# Patient Record
Sex: MALE | Race: WHITE | Employment: FULL TIME | URBAN - NONMETROPOLITAN AREA
[De-identification: names, ages, dates, MRNs, and addresses within clinical notes are randomized per-mention and may not be internally consistent; named-entity substitution may affect disease eponyms.]

---

## 2018-02-15 ENCOUNTER — DOCUMENTATION ONLY (OUTPATIENT)
Dept: FAMILY MEDICINE | Facility: OTHER | Age: 23
End: 2018-02-15

## 2020-06-21 ENCOUNTER — APPOINTMENT (OUTPATIENT)
Dept: GENERAL RADIOLOGY | Facility: OTHER | Age: 25
End: 2020-06-21
Attending: PHYSICIAN ASSISTANT
Payer: COMMERCIAL

## 2020-06-21 ENCOUNTER — HOSPITAL ENCOUNTER (EMERGENCY)
Facility: OTHER | Age: 25
Discharge: HOME | End: 2020-06-21
Attending: PHYSICIAN ASSISTANT | Admitting: PHYSICIAN ASSISTANT
Payer: COMMERCIAL

## 2020-06-21 VITALS
SYSTOLIC BLOOD PRESSURE: 136 MMHG | BODY MASS INDEX: 23.22 KG/M2 | TEMPERATURE: 97.5 F | RESPIRATION RATE: 18 BRPM | DIASTOLIC BLOOD PRESSURE: 87 MMHG | HEART RATE: 87 BPM | WEIGHT: 170 LBS | OXYGEN SATURATION: 98 %

## 2020-06-21 DIAGNOSIS — T07.XXXA ABRASIONS OF MULTIPLE SITES: ICD-10-CM
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
## 2020-06-21 DIAGNOSIS — M25.561 ACUTE PAIN OF RIGHT KNEE: ICD-10-CM

## 2020-06-21 DIAGNOSIS — M23.91 INTERNAL DERANGEMENT OF KNEE, RIGHT: ICD-10-CM

## 2020-06-21 PROCEDURE — 25000132 ZZH RX MED GY IP 250 OP 250 PS 637: Performed by: PHYSICIAN ASSISTANT

## 2020-06-21 PROCEDURE — 73562 X-RAY EXAM OF KNEE 3: CPT | Mod: RT

## 2020-06-21 PROCEDURE — 99283 EMERGENCY DEPT VISIT LOW MDM: CPT | Mod: Z6 | Performed by: PHYSICIAN ASSISTANT

## 2020-06-21 PROCEDURE — 99283 EMERGENCY DEPT VISIT LOW MDM: CPT | Performed by: PHYSICIAN ASSISTANT

## 2020-06-21 RX ORDER — SULFAMETHOXAZOLE/TRIMETHOPRIM 800-160 MG
1 TABLET ORAL ONCE
Status: COMPLETED | OUTPATIENT
Start: 2020-06-21 | End: 2020-06-21

## 2020-06-21 RX ORDER — GINSENG 100 MG
500 CAPSULE ORAL ONCE
Status: DISCONTINUED | OUTPATIENT
Start: 2020-06-21 | End: 2020-06-21 | Stop reason: HOSPADM

## 2020-06-21 RX ORDER — IBUPROFEN 800 MG/1
800 TABLET, FILM COATED ORAL EVERY 8 HOURS PRN
Qty: 60 TABLET | Refills: 0 | Status: SHIPPED | OUTPATIENT
Start: 2020-06-21

## 2020-06-21 RX ORDER — SULFAMETHOXAZOLE/TRIMETHOPRIM 800-160 MG
1 TABLET ORAL 2 TIMES DAILY
Qty: 20 TABLET | Refills: 0 | Status: SHIPPED | OUTPATIENT
Start: 2020-06-21 | End: 2020-07-01

## 2020-06-21 RX ORDER — HYDROCODONE BITARTRATE AND ACETAMINOPHEN 5; 325 MG/1; MG/1
1 TABLET ORAL ONCE
Status: COMPLETED | OUTPATIENT
Start: 2020-06-21 | End: 2020-06-21

## 2020-06-21 RX ADMIN — SULFAMETHOXAZOLE AND TRIMETHOPRIM 1 TABLET: 800; 160 TABLET ORAL at 17:50

## 2020-06-21 RX ADMIN — HYDROCODONE BITARTRATE AND ACETAMINOPHEN 1 TABLET: 5; 325 TABLET ORAL at 17:24

## 2020-06-21 ASSESSMENT — ENCOUNTER SYMPTOMS
CHEST TIGHTNESS: 0
WOUND: 1
SINUS PRESSURE: 0
LIGHT-HEADEDNESS: 0
VOMITING: 0
ABDOMINAL PAIN: 0
COUGH: 0
VOICE CHANGE: 0
CONFUSION: 0
EYE PAIN: 0
DYSURIA: 0
SEIZURES: 0
FEVER: 0
TROUBLE SWALLOWING: 0
DIZZINESS: 0
NAUSEA: 0
SORE THROAT: 0
FATIGUE: 0
ADENOPATHY: 0
WEAKNESS: 0
SHORTNESS OF BREATH: 0
HEADACHES: 0
DIARRHEA: 0
BRUISES/BLEEDS EASILY: 0
APPETITE CHANGE: 0
HEMATURIA: 0
ACTIVITY CHANGE: 0
BACK PAIN: 0
FACIAL SWELLING: 0
FREQUENCY: 0
CHILLS: 0
NECK PAIN: 0

## 2020-06-21 NOTE — LETTER
Negative  June 21, 2020      To Whom It May Concern:      Harry Childress was seen in our Emergency Department today, 06/21/20.  He will need the next few days off work to recover from his injuries but can return to work on 6/25/2020.  He may return sooner if his symptoms improve considerably.      Sincerely,              Benedict Luu PA-C

## 2020-06-21 NOTE — ED TRIAGE NOTES
Pt was long boarding yesterday when his board hit a twig and he lost control and fell off his board while going down a hill. Pt hit on his right side and landed on the cement and rolled and then states he was quickly able to get back up on his feet. Pt did not hit his head. Pt states both his knees has abrasion and so does his right fingers. Pt states he iced his knee yesterday but today he could barely put any weight on it. Pt rates right knee pain at 9/10 when standing. Pt also feels pain in the back of his right calf.  Renae Drew RN .............. 6/21/2020  3:40 PM

## 2020-06-21 NOTE — ED PROVIDER NOTES
History     Chief Complaint   Patient presents with     Knee Pain     HPI  Harry Childress is a 24 year old male who was long boarding yesterday when he hit a twig and lost control and fell off his long board.  He sustained abrasions to both his knees as well as his right lower back area.  He was able to ambulate yesterday however he has worsening pain today.  Rates pain as a 9/10 when standing.  Denies any other injuries denies any head injury.  He denies having helmet or elbow or knee pads on at the time.    Allergies:  No Known Allergies    Problem List:    Patient Active Problem List    Diagnosis Date Noted     Sinusitis, acute 08/15/2012     Priority: Medium     Acute pharyngitis 2012     Priority: Medium        Past Medical History:    No past medical history on file.    Past Surgical History:    No past surgical history on file.    Family History:    No family history on file.    Social History:  Marital Status:  Single [1]  Social History     Tobacco Use     Smoking status: Former Smoker     Packs/day: 0.10     Types: Cigarettes     Last attempt to quit: 2015     Years since quittin.9     Smokeless tobacco: Current User     Types: Chew   Substance Use Topics     Alcohol use: No     Alcohol/week: 0.0 standard drinks     Drug use: Unknown     Types: Other     Comment: Drug use: No        Medications:    No current outpatient medications on file.        Review of Systems   Constitutional: Negative for activity change, appetite change, chills, fatigue and fever.   HENT: Negative for congestion, facial swelling, sinus pressure, sore throat, trouble swallowing and voice change.    Eyes: Negative for pain and visual disturbance.   Respiratory: Negative for cough, chest tightness and shortness of breath.    Cardiovascular: Negative for chest pain.   Gastrointestinal: Negative for abdominal pain, diarrhea, nausea and vomiting.   Genitourinary: Negative for dysuria, frequency, hematuria and urgency.    Musculoskeletal: Negative for back pain and neck pain.   Skin: Positive for wound. Negative for rash.        Multiple abrasions to his right lower back area, bilateral knee area, lateral right knee area, and right elbow.   Neurological: Negative for dizziness, seizures, syncope, weakness, light-headedness and headaches.   Hematological: Negative for adenopathy. Does not bruise/bleed easily.   Psychiatric/Behavioral: Negative for confusion.       Physical Exam   BP: 136/87  Pulse: 87  Heart Rate: 98  Temp: 97.5  F (36.4  C)  Resp: 18  Weight: 77.1 kg (170 lb)  SpO2: 98 %      Physical Exam  Constitutional:       General: He is not in acute distress.     Appearance: He is not ill-appearing, toxic-appearing or diaphoretic.   HENT:      Head: Atraumatic.      Right Ear: Tympanic membrane normal. No drainage or tenderness.      Left Ear: Tympanic membrane normal. No drainage or tenderness.      Nose: Nose normal. No rhinorrhea.   Eyes:      General: No scleral icterus.     Extraocular Movements: Extraocular movements intact.      Right eye: Normal extraocular motion and no nystagmus.      Left eye: Normal extraocular motion and no nystagmus.      Pupils: Pupils are equal, round, and reactive to light. Pupils are equal.      Funduscopic exam:     Right eye: No AV nicking or papilledema. Red reflex present.         Left eye: No AV nicking or papilledema. Red reflex present.  Neck:      Musculoskeletal: Normal range of motion. No neck rigidity, pain with movement or muscular tenderness.      Vascular: No JVD.      Trachea: No tracheal deviation.   Cardiovascular:      Rate and Rhythm: Normal rate and regular rhythm.      Heart sounds: Normal heart sounds. No friction rub.   Pulmonary:      Effort: No respiratory distress.      Breath sounds: Normal breath sounds. No stridor.   Abdominal:      General: Bowel sounds are normal.      Palpations: Abdomen is soft.      Tenderness: There is no abdominal tenderness. There is no  guarding or rebound.   Musculoskeletal: Normal range of motion.         General: No tenderness.      Comments: Multiple abrasions to his right lower back area, bilateral knee area, lateral right knee area, and right elbow.  Some increased erythema around his right knee wound.  All of these wounds were cleansed with povidone-iodine.  Otherwise CMS x4.   Lymphadenopathy:      Cervical: No cervical adenopathy.      Right cervical: No superficial cervical adenopathy.     Left cervical: No superficial cervical adenopathy.   Skin:     General: Skin is warm.      Capillary Refill: Capillary refill takes less than 2 seconds.      Findings: No rash.   Neurological:      General: No focal deficit present.      Mental Status: He is alert and oriented to person, place, and time.         ED Course        Results for orders placed or performed during the hospital encounter of 06/21/20 (from the past 24 hour(s))   XR Knee Right 3 Views    Narrative    XR KNEE RT 3 VW    HISTORY: 24 years Male pain    COMPARISON: None    TECHNIQUE: 3 views right knee    FINDINGS: Joint spaces are congruent. There is no significant  arthritic change. There is no evidence of fracture or dislocation.  There is a small joint effusion.      Impression    IMPRESSION: Very small joint effusion otherwise negative study.    NATO LARSEN MD       Medications   bacitracin ointment 1 g (has no administration in time range)   HYDROcodone-acetaminophen (NORCO) 5-325 MG per tablet 1 tablet (1 tablet Oral Given 6/21/20 1724)   sulfamethoxazole-trimethoprim (BACTRIM DS) 800-160 MG per tablet 1 tablet (1 tablet Oral Given 6/21/20 1750)       Assessments & Plan (with Medical Decision Making)     I have reviewed the nursing notes.    I have reviewed the findings, diagnosis, plan and need for follow up with the patient.      New Prescriptions    IBUPROFEN (ADVIL/MOTRIN) 800 MG TABLET    Take 1 tablet (800 mg) by mouth every 8 hours as needed for moderate pain     SULFAMETHOXAZOLE-TRIMETHOPRIM (BACTRIM DS) 800-160 MG TABLET    Take 1 tablet by mouth 2 times daily for 10 days       Final diagnoses:   Fall from skateboard, initial encounter   Internal derangement of knee, right   Acute pain of right knee   Abrasions of multiple sites - Bilateral knees, right forearm, and right posterior back,     Afebrile.  Vital signs stable.  Patient falling off a long board yesterday here today with increasing right knee pain and inability to ambulate due to pain.  Significant multiple abrasions to his bilateral knees, right forearm, and posterior right lower back area.  These were cleansed with povidone-iodine and re-bandaged with bacitracin Adaptic 4 x 4's Kerlix and Ace bandages.  Right knee x-rays show a very small joint effusion otherwise unremarkable.  Right knee internal derangement and acute pain.  He was given an oral Norco in the ER for pain relief.  Also given a Bactrim to help prevent infection given his areas of abrasions.  He will need to follow-up with his primary care provider when he returns for further evaluation of his right knee pain.  Rx in the short-term for Motrin, Norco and Bactrim.  Follow-up sooner if there is any other concerns problems or questions.  He does note initially that he has crutches at home and declines need from here.  However then later informed the RN that he probably needs a new pair of crutches and he was fitted for some.        6/21/2020   [facility]     Benedict Leon PA-C  06/21/20 1803       Benedict Leon PA-C  06/21/20 1814

## 2020-06-21 NOTE — ED AVS SNAPSHOT
[facility]  [address]  [place] [address]  Phone:  [phone]  Fax:  [phone]                                    Harry Childress   MRN: [id number]    Department:  [facility]   Date of Visit:  6/21/2020           After Visit Summary Signature Page    I have received my discharge instructions, and my questions have been answered. I have discussed any challenges I see with this plan with the nurse or doctor.    ..........................................................................................................................................  Patient/Patient Representative Signature      ..........................................................................................................................................  Patient Representative Print Name and Relationship to Patient    ..................................................               ................................................  Date                                   Time    ..........................................................................................................................................  Reviewed by Signature/Title    ...................................................              ..............................................  Date                                               Time          22EPIC Rev 08/18

## 2020-06-21 NOTE — ED NOTES
Crutches given to patient. Abrasions to both knees bacitracin applied. Right knee wrapped with ace wrap. Renae rDew RN .............. 6/21/2020  6:16 PM

## (undated) RX ORDER — NEOMYCIN/BACITRACIN/POLYMYXINB 3.5-400-5K
OINTMENT (GRAM) TOPICAL
Status: DISPENSED
Start: 2020-06-21

## (undated) RX ORDER — SULFAMETHOXAZOLE/TRIMETHOPRIM 800-160 MG
TABLET ORAL
Status: DISPENSED
Start: 2020-06-21

## (undated) RX ORDER — HYDROCODONE BITARTRATE AND ACETAMINOPHEN 5; 325 MG/1; MG/1
TABLET ORAL
Status: DISPENSED
Start: 2020-06-21